# Patient Record
Sex: FEMALE | Race: WHITE | NOT HISPANIC OR LATINO | Employment: UNEMPLOYED | URBAN - NONMETROPOLITAN AREA
[De-identification: names, ages, dates, MRNs, and addresses within clinical notes are randomized per-mention and may not be internally consistent; named-entity substitution may affect disease eponyms.]

---
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
## 2024-10-16 NOTE — TELEPHONE ENCOUNTER
Last OV 08/14/2024  Pending 02/28/2025    Last labs 02/2024  Pending labs for 02/2025   Patient has had a poor response to stimulants will need to see her before we begin anything new

## 2025-07-30 ENCOUNTER — TRANSCRIBE ORDERS (OUTPATIENT)
Dept: ADMINISTRATIVE | Facility: HOSPITAL | Age: 20
End: 2025-07-30
Payer: COMMERCIAL

## 2025-07-30 DIAGNOSIS — H57.12 LEFT EYE PAIN: ICD-10-CM

## 2025-07-30 DIAGNOSIS — R53.1 WEAKNESS: ICD-10-CM

## 2025-07-30 DIAGNOSIS — R29.2 ABNORMAL DEEP TENDON REFLEX: Primary | ICD-10-CM

## 2025-08-08 ENCOUNTER — TRANSCRIBE ORDERS (OUTPATIENT)
Dept: ADMINISTRATIVE | Facility: HOSPITAL | Age: 20
End: 2025-08-08
Payer: COMMERCIAL

## 2025-08-08 DIAGNOSIS — H57.12 PAIN IN LEFT EYE: ICD-10-CM

## 2025-08-08 DIAGNOSIS — R53.1 WEAKNESS: ICD-10-CM

## 2025-08-08 DIAGNOSIS — R29.2 ABNORMAL DEEP TENDON REFLEX: Primary | ICD-10-CM

## 2025-08-15 ENCOUNTER — TRANSCRIBE ORDERS (OUTPATIENT)
Dept: ADMINISTRATIVE | Facility: HOSPITAL | Age: 20
End: 2025-08-15
Payer: COMMERCIAL

## 2025-08-15 DIAGNOSIS — H57.12 EYE PAIN, LEFT: ICD-10-CM

## 2025-08-15 DIAGNOSIS — M41.9 SCOLIOSIS, UNSPECIFIED SCOLIOSIS TYPE, UNSPECIFIED SPINAL REGION: ICD-10-CM

## 2025-08-15 DIAGNOSIS — R53.1 WEAKNESS: ICD-10-CM

## 2025-08-15 DIAGNOSIS — M54.50 LOW BACK PAIN, UNSPECIFIED BACK PAIN LATERALITY, UNSPECIFIED CHRONICITY, UNSPECIFIED WHETHER SCIATICA PRESENT: ICD-10-CM

## 2025-08-15 DIAGNOSIS — R29.2 ABNORMAL DEEP TENDON REFLEX: Primary | ICD-10-CM

## 2025-08-17 ENCOUNTER — HOSPITAL ENCOUNTER (OUTPATIENT)
Dept: MRI IMAGING | Facility: HOSPITAL | Age: 20
Discharge: HOME | End: 2025-08-17
Payer: COMMERCIAL

## 2025-08-17 DIAGNOSIS — H57.12 LEFT EYE PAIN: ICD-10-CM

## 2025-08-17 DIAGNOSIS — R29.2 ABNORMAL DEEP TENDON REFLEX: ICD-10-CM

## 2025-08-17 DIAGNOSIS — H57.12 PAIN IN LEFT EYE: ICD-10-CM

## 2025-08-17 DIAGNOSIS — R53.1 WEAKNESS: ICD-10-CM

## 2025-08-17 PROCEDURE — 25510000001 GADOPICLENOL 0.5 MMOL/ML SOLUTION

## 2025-08-17 PROCEDURE — 70553 MRI BRAIN STEM W/O & W/DYE: CPT

## 2025-08-17 PROCEDURE — A9573 GADOPICLENOL 0.5 MMOL/ML SOLUTION: HCPCS

## 2025-08-17 PROCEDURE — 72156 MRI NECK SPINE W/O & W/DYE: CPT

## 2025-08-17 PROCEDURE — 72157 MRI CHEST SPINE W/O & W/DYE: CPT

## 2025-08-17 RX ADMIN — GADOPICLENOL 5 ML: 485.1 INJECTION INTRAVENOUS at 08:16

## 2025-08-28 ENCOUNTER — HOSPITAL ENCOUNTER (OUTPATIENT)
Dept: MRI IMAGING | Facility: HOSPITAL | Age: 20
Discharge: HOME | End: 2025-08-28
Payer: COMMERCIAL

## 2025-08-28 DIAGNOSIS — H57.12 EYE PAIN, LEFT: ICD-10-CM

## 2025-08-28 DIAGNOSIS — M41.9 SCOLIOSIS, UNSPECIFIED SCOLIOSIS TYPE, UNSPECIFIED SPINAL REGION: ICD-10-CM

## 2025-08-28 DIAGNOSIS — R29.2 ABNORMAL DEEP TENDON REFLEX: ICD-10-CM

## 2025-08-28 DIAGNOSIS — R53.1 WEAKNESS: ICD-10-CM

## 2025-08-28 DIAGNOSIS — M54.50 LOW BACK PAIN, UNSPECIFIED BACK PAIN LATERALITY, UNSPECIFIED CHRONICITY, UNSPECIFIED WHETHER SCIATICA PRESENT: ICD-10-CM

## 2025-08-28 PROCEDURE — 72148 MRI LUMBAR SPINE W/O DYE: CPT
